# Patient Record
Sex: FEMALE | Race: WHITE | NOT HISPANIC OR LATINO | Employment: OTHER | URBAN - METROPOLITAN AREA
[De-identification: names, ages, dates, MRNs, and addresses within clinical notes are randomized per-mention and may not be internally consistent; named-entity substitution may affect disease eponyms.]

---

## 2017-08-03 ENCOUNTER — INITIAL CONSULT (OUTPATIENT)
Dept: ELECTROPHYSIOLOGY | Facility: CLINIC | Age: 67
End: 2017-08-03
Payer: MEDICARE

## 2017-08-03 ENCOUNTER — HOSPITAL ENCOUNTER (OUTPATIENT)
Dept: CARDIOLOGY | Facility: CLINIC | Age: 67
Discharge: HOME | End: 2017-08-03
Payer: MEDICARE

## 2017-08-03 VITALS
HEART RATE: 63 BPM | DIASTOLIC BLOOD PRESSURE: 80 MMHG | BODY MASS INDEX: 39.49 KG/M2 | WEIGHT: 222.88 LBS | HEIGHT: 63 IN | SYSTOLIC BLOOD PRESSURE: 146 MMHG

## 2017-08-03 DIAGNOSIS — R00.0 TACHYCARDIA: ICD-10-CM

## 2017-08-03 DIAGNOSIS — I47.10 SVT (SUPRAVENTRICULAR TACHYCARDIA): Primary | ICD-10-CM

## 2017-08-03 PROCEDURE — 99205 OFFICE O/P NEW HI 60 MIN: CPT | Mod: S$PBB,,, | Performed by: INTERNAL MEDICINE

## 2017-08-03 PROCEDURE — 93005 ELECTROCARDIOGRAM TRACING: CPT | Mod: PBBFAC | Performed by: INTERNAL MEDICINE

## 2017-08-03 PROCEDURE — 1126F AMNT PAIN NOTED NONE PRSNT: CPT | Mod: ,,, | Performed by: INTERNAL MEDICINE

## 2017-08-03 PROCEDURE — 99999 PR PBB SHADOW E&M-EST. PATIENT-LVL III: CPT | Mod: PBBFAC,,, | Performed by: INTERNAL MEDICINE

## 2017-08-03 PROCEDURE — 1159F MED LIST DOCD IN RCRD: CPT | Mod: ,,, | Performed by: INTERNAL MEDICINE

## 2017-08-03 PROCEDURE — 99213 OFFICE O/P EST LOW 20 MIN: CPT | Mod: PBBFAC | Performed by: INTERNAL MEDICINE

## 2017-08-03 PROCEDURE — 93010 ELECTROCARDIOGRAM REPORT: CPT | Mod: S$PBB,,, | Performed by: INTERNAL MEDICINE

## 2017-08-03 PROCEDURE — 3008F BODY MASS INDEX DOCD: CPT | Mod: ,,, | Performed by: INTERNAL MEDICINE

## 2017-08-03 RX ORDER — MONTELUKAST SODIUM 10 MG/1
10 TABLET ORAL
COMMUNITY
Start: 2017-07-10 | End: 2018-07-10

## 2017-08-03 RX ORDER — FUROSEMIDE 20 MG/1
30 TABLET ORAL
COMMUNITY
Start: 2017-06-25 | End: 2018-06-25

## 2017-08-03 RX ORDER — FLUTICASONE FUROATE AND VILANTEROL 100; 25 UG/1; UG/1
1 POWDER RESPIRATORY (INHALATION)
COMMUNITY
Start: 2017-06-19

## 2017-08-03 RX ORDER — KETOCONAZOLE 20 MG/G
CREAM TOPICAL
COMMUNITY
Start: 2017-06-14 | End: 2018-02-22

## 2017-08-03 RX ORDER — BRIMONIDINE TARTRATE 1 MG/ML
SOLUTION/ DROPS OPHTHALMIC
COMMUNITY
Start: 2017-03-14

## 2017-08-03 RX ORDER — IPRATROPIUM BROMIDE AND ALBUTEROL SULFATE 2.5; .5 MG/3ML; MG/3ML
SOLUTION RESPIRATORY (INHALATION)
COMMUNITY
Start: 2017-06-19

## 2017-08-03 RX ORDER — ACETAMINOPHEN, DIPHENHYDRAMINE HCL, PHENYLEPHRINE HCL 325; 25; 5 MG/1; MG/1; MG/1
TABLET ORAL
COMMUNITY

## 2017-08-03 NOTE — PROGRESS NOTES
Subjective:    Patient ID:  Puneet Burk is a 66 y.o. female who presents for evaluation of Tachycardia      66 yoF HTN, SVT here for evaluation. She had an SVT episode with  bpm that was responsive to adenosine (2004). Prior to this she had similar symptoms for several years and was able to break the events with coughing and bearing down. She was placed on verapamil for suppression which worked for many years. She had hypotension leading to removing her verapamil recently. She then had onset of SVT events and is back on her verapamil. She feels palpitations on most days, mostly at night. They can last several minutes to an hour. She gets shortness of breath with her episodes. She feels that the events are worsening. She has normal LV function based on a normal stress test 2015 per her recollection. She had a documented normal stress test 2010. She underwent an angiogram 2004 after her SVT episode which showed normal coronary arteries. No history of syncope or near syncope. No recent cardiac studies or evaluation.     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  No date: TUBAL LIGATION  No date: VULVECTOMY    Social History    Marital status:              Spouse name:                       Years of education:                 Number of children:               Occupational History    None on file    Social History Main Topics    Smoking status:  Current Some Day Smoker                                                      Packs/day: 1.50      Years: 0.00        Smokeless tobacco: Never Used                        Alcohol use: Yes           1.2 oz/week       Glasses of wine: 2 per week       Comment: daily    Drug use: No              Sexual activity: Not on file          Other Topics            Concern    None on file    Social History Narrative    None on file    Review of patient's family history indicates:    Hypertension                   Mother                    Diabetes                       Mother                    Hypertension                   Father                    Hypertension                   Sister                    Diabetes                       Sister                    Ovarian cancer                 Neg Hx                    Uterine cancer                 Neg Hx                    Breast cancer                  Neg Hx                    Colon cancer                   Neg Hx                          Review of Systems   Constitution: Negative.   HENT: Negative.    Eyes: Negative.    Cardiovascular: Negative for chest pain, dyspnea on exertion, leg swelling, near-syncope, palpitations and syncope.   Respiratory: Negative.  Negative for shortness of breath.    Endocrine: Negative.    Hematologic/Lymphatic: Negative.    Skin: Negative.    Musculoskeletal: Negative.    Gastrointestinal: Negative.    Genitourinary: Negative.    Neurological: Negative.  Negative for dizziness and light-headedness.   Psychiatric/Behavioral: Negative.    Allergic/Immunologic: Negative.         Objective:    Physical Exam   Constitutional: She is oriented to person, place, and time. She appears well-developed and well-nourished. No distress.   HENT:   Head: Normocephalic and atraumatic.   Eyes: EOM are normal. Pupils are equal, round, and reactive to light.   Neck: Normal range of motion. No JVD present. No thyromegaly present.   Cardiovascular: Normal rate,  regular rhythm, S1 normal, S2 normal and normal heart sounds.  PMI is not displaced.  Exam reveals no gallop and no friction rub.    No murmur heard.  Pulmonary/Chest: Effort normal and breath sounds normal. No respiratory distress. She has no wheezes. She has no rales.   Abdominal: Soft. Bowel sounds are normal. She exhibits no distension. There is no tenderness. There is no rebound and no guarding.   Musculoskeletal: Normal range of motion. She exhibits no edema or tenderness.   Neurological: She is alert and oriented to person, place, and time. No cranial nerve deficit.   Skin: Skin is warm and dry. No rash noted. No erythema.   Psychiatric: She has a normal mood and affect. Her behavior is normal. Judgment and thought content normal.   Vitals reviewed.    ECG: NSR nl NV, QRS, QTc        Assessment:       1. SVT (supraventricular tachycardia)         Plan:       66 yoF with history of SVT here for evaluation. She has history and symptoms most likely consistent with an AVN dependent SVT. She is having recurrent palpitations on verapamil. It is unclear if her events are due to her primary SVT or another rhythm disturbance. Will get a Zio patch to assess her symptom/rhythm correlation. RTC 4-6 weeks.

## 2017-08-04 DIAGNOSIS — R00.0 TACHYCARDIA: Primary | ICD-10-CM

## 2017-08-31 ENCOUNTER — CLINICAL SUPPORT (OUTPATIENT)
Dept: ELECTROPHYSIOLOGY | Facility: CLINIC | Age: 67
End: 2017-08-31
Payer: MEDICARE

## 2017-08-31 DIAGNOSIS — I47.10 SVT (SUPRAVENTRICULAR TACHYCARDIA): ICD-10-CM

## 2017-08-31 PROCEDURE — 0296T HOLTER MONITOR - 3-14 DAY ADULT: CPT | Mod: PBBFAC | Performed by: INTERNAL MEDICINE

## 2017-08-31 PROCEDURE — 0298T HOLTER MONITOR - 3-14 DAY ADULT: CPT | Mod: ,,, | Performed by: INTERNAL MEDICINE

## 2017-09-24 ENCOUNTER — PATIENT MESSAGE (OUTPATIENT)
Dept: ELECTROPHYSIOLOGY | Facility: CLINIC | Age: 67
End: 2017-09-24

## 2017-09-28 ENCOUNTER — PATIENT MESSAGE (OUTPATIENT)
Dept: ELECTROPHYSIOLOGY | Facility: CLINIC | Age: 67
End: 2017-09-28

## 2017-09-30 ENCOUNTER — TELEPHONE (OUTPATIENT)
Dept: ELECTROPHYSIOLOGY | Facility: CLINIC | Age: 67
End: 2017-09-30

## 2017-09-30 NOTE — TELEPHONE ENCOUNTER
F/u apt on 10/3 canceled. MD called pt with results from zio monitor on 9/28, and pt agrees in-clinic follow up not necessary next week. She will call back with any questions/concerns/issues.

## 2018-01-19 ENCOUNTER — TELEPHONE (OUTPATIENT)
Dept: ENDOCRINOLOGY | Facility: CLINIC | Age: 68
End: 2018-01-19

## 2018-01-19 NOTE — TELEPHONE ENCOUNTER
----- Message from Rita Hankins sent at 1/19/2018  9:45 AM CST -----  Contact: Self  Returning your call.

## 2018-01-19 NOTE — TELEPHONE ENCOUNTER
1st attempt.    ----- Message from Rita Hankins sent at 1/18/2018  3:26 PM CST -----  Contact: Self  Ms Burk and Rios had appts together on 1/18 ( Dip) but had to cancel d/t bad weather I was able to reschedule Rios on 2/22 but could not get her the same day appt. They travel from [place] is there a way you can schedule appts together.    Thanks  Rios MRN-[id number]

## 2018-02-22 ENCOUNTER — HOSPITAL ENCOUNTER (OUTPATIENT)
Dept: RADIOLOGY | Facility: HOSPITAL | Age: 68
Discharge: HOME | End: 2018-02-22
Attending: INTERNAL MEDICINE
Payer: MEDICARE

## 2018-02-22 ENCOUNTER — OFFICE VISIT (OUTPATIENT)
Dept: ENDOCRINOLOGY | Facility: CLINIC | Age: 68
End: 2018-02-22
Payer: MEDICARE

## 2018-02-22 VITALS
HEIGHT: 63 IN | DIASTOLIC BLOOD PRESSURE: 70 MMHG | BODY MASS INDEX: 38.28 KG/M2 | WEIGHT: 216.06 LBS | SYSTOLIC BLOOD PRESSURE: 140 MMHG | HEART RATE: 74 BPM

## 2018-02-22 DIAGNOSIS — R73.09 ABNORMAL GLUCOSE: Primary | ICD-10-CM

## 2018-02-22 DIAGNOSIS — S32.9XXS CLOSED NONDISPLACED FRACTURE OF PELVIS, UNSPECIFIED PART OF PELVIS, SEQUELA: ICD-10-CM

## 2018-02-22 DIAGNOSIS — M85.80 LOW BONE MASS: ICD-10-CM

## 2018-02-22 DIAGNOSIS — E89.0 HYPOTHYROIDISM, POSTRADIOIODINE THERAPY: ICD-10-CM

## 2018-02-22 PROCEDURE — 99213 OFFICE O/P EST LOW 20 MIN: CPT | Mod: PBBFAC | Performed by: INTERNAL MEDICINE

## 2018-02-22 PROCEDURE — 99999 PR PBB SHADOW E&M-EST. PATIENT-LVL III: CPT | Mod: PBBFAC,,, | Performed by: INTERNAL MEDICINE

## 2018-02-22 PROCEDURE — 99204 OFFICE O/P NEW MOD 45 MIN: CPT | Mod: S$PBB,,, | Performed by: INTERNAL MEDICINE

## 2018-02-22 PROCEDURE — 73521 X-RAY EXAM HIPS BI 2 VIEWS: CPT | Mod: TC

## 2018-02-22 PROCEDURE — 1159F MED LIST DOCD IN RCRD: CPT | Mod: ,,, | Performed by: INTERNAL MEDICINE

## 2018-02-22 PROCEDURE — 73521 X-RAY EXAM HIPS BI 2 VIEWS: CPT | Mod: 26,,, | Performed by: RADIOLOGY

## 2018-02-22 PROCEDURE — 1126F AMNT PAIN NOTED NONE PRSNT: CPT | Mod: ,,, | Performed by: INTERNAL MEDICINE

## 2018-02-22 RX ORDER — ESOMEPRAZOLE MAGNESIUM 20 MG/1
20 GRANULE, DELAYED RELEASE ORAL
COMMUNITY

## 2018-02-22 RX ORDER — VIT C/E/ZN/COPPR/LUTEIN/ZEAXAN 250MG-90MG
1 CAPSULE ORAL DAILY
COMMUNITY

## 2018-02-22 RX ORDER — LEVOTHYROXINE SODIUM 137 UG/1
1 TABLET ORAL DAILY
COMMUNITY
Start: 2017-04-17 | End: 2018-02-26

## 2018-02-22 RX ORDER — CLOPIDOGREL BISULFATE 75 MG/1
75 TABLET ORAL
COMMUNITY
Start: 2017-08-10 | End: 2018-02-22 | Stop reason: SDUPTHER

## 2018-02-22 RX ORDER — POTASSIUM CHLORIDE 750 MG/1
1 TABLET, EXTENDED RELEASE ORAL DAILY
COMMUNITY
Start: 2017-11-02

## 2018-02-22 NOTE — PROGRESS NOTES
Subjective:     Patient ID: Puneet Burk is a 67 y.o. female.    Chief Complaint: Osteoporosis and Hyperthyroidism    HPI:   Ms. Burk is a 67 y.o. female who is here for a consult visit for evaluation  and management of osteoporosis and hyperthyroidism due to Grave's disease.  Diagnosed with Graves' disease forty years ago. Treated with radioactive iodine (twice) without apparent change to proptosis of her eyes.   Levothyroxine 137 mcg one tablet daily on empty stomach with water but with her other pills.   (takes it with potassium, lasix and ca-vitamin D3). The dose was changed three months ago, this was increased from 100 mcg daily.     Has PVCs with palpitations but no recent worsening. Uses cpap machine nightly. Denies tremulousness or anxiety.     Diagnosed with bone density one year ago. Left FN - 2.9 on scan done 4/2017, previously had osteopenia of the lumbar spine five years ago.     Fractured her left hip/pelvis after falling out of the bath tub (10/2016). She attributed pain after fall to arthritis. She received conservative care for pain (not known she had fracture) including steroid injections. (she did not have surgery. Underwent PT once hip/pelvis fracture was identified 7/2017).   Has not used any treatments.     Osteoporosis Risk Factor Assessment:    Menarche occurred at 8 years of age.   Menopause occurred at early to mid 40s. Her menstrual cycles were normal throughout her reproductive life.   Denies past history of chronic illness, restrictive dieting as a child/adolescent or young adult.     Denies history of falls over the age of fifty or fractures to her wrist, hip or spine. Not sure if lost height.   Grandmother with osteoporosis. Denies hip fractures in mother or sister. (sister with fracture of spine)     Used prempro for about ten years. Uses steroids injections for sinus infections -- one per year. Uses nexium 20 mg daily for many years.   Denies exposure to diabetes medications or  antiseizure medications.     She denies calcium disorders, kidney stones, malabsorption symptoms, including chronic diarrhea, bloating, anemia or kidney disease. Reports increased gas since using CPAP machine.     Supplements:  Calcium citrate  Vitamin D3     Dietary:  Cheese     Review of Systems   Constitutional: Negative for chills and fever.        Flu about one month ago.    HENT: Negative for congestion and sinus pressure.    Eyes: Negative for visual disturbance.   Respiratory: Negative for chest tightness and shortness of breath.    Cardiovascular: Positive for leg swelling. Negative for chest pain and palpitations.   Gastrointestinal: Negative for abdominal pain and vomiting.   Genitourinary: Negative for dysuria.        UTI   Musculoskeletal: Negative for arthralgias.   Skin: Negative for rash.   Neurological: Negative for weakness.   Hematological: Does not bruise/bleed easily.   Psychiatric/Behavioral: Negative for sleep disturbance.        Objective:     Physical Exam   Constitutional: She is oriented to person, place, and time. She appears well-developed and well-nourished. No distress.   HENT:   Head: Normocephalic and atraumatic.   Nose: Nose normal.   Mouth/Throat: Oropharynx is clear and moist. No oropharyngeal exudate.   Eyes: Conjunctivae and EOM are normal. Pupils are equal, round, and reactive to light. No scleral icterus.   Neck: Normal range of motion. Neck supple. No thyromegaly present.   + soft tissue swelling around neck     Cardiovascular: Normal rate, regular rhythm, normal heart sounds and intact distal pulses.    Pulmonary/Chest: Effort normal and breath sounds normal.   Abdominal: Soft. Bowel sounds are normal. She exhibits no distension.   Musculoskeletal: Normal range of motion. She exhibits no edema or tenderness.   Lymphadenopathy:     She has no cervical adenopathy.   Neurological: She is alert and oriented to person, place, and time. She has normal reflexes.   Skin: Skin is  "warm and dry.   Psychiatric: She has a normal mood and affect.       Vitals:    02/22/18 1312   BP: (!) 140/70   BP Location: Left arm   Patient Position: Sitting   BP Method: Medium (Manual)   Pulse: 74   Weight: 98 kg (216 lb 0.8 oz)   Height: 5' 3" (1.6 m)       Assessment/Plan:     Ms. Burk is a 67 year old woman with osteoporosis diagnosed from recent T score of left FN. JOSE J is not quite adequate and also area of previous fracture.     1. Low bone mass  - continue calcium and vitamin D  - decreased treatment options if needed.   - may need a repeat DXA  - Vitamin D; Future    2. Hypothyroidism, postradioiodine therapy  - big dose, asked her to take separately from the rest of her pills   - avoid iatrogenic thyrotoxicosis bone loss and pvc's  - TSH; Future    3. Abnormal glucose  - Basic metabolic panel; Future  - Hemoglobin A1c; Future    4. Closed nondisplaced fracture of pelvis, unspecified part of pelvis, sequela  - X-Ray Hips Bilateral 2 View Incl AP Pelvis; Future        "

## 2018-02-22 NOTE — PATIENT INSTRUCTIONS
Please make sure you are taking thyroid hormone on an empty stomach with water only, wait thirty to forty five minutes before you take any other pill, vitamin or food.

## 2018-02-26 ENCOUNTER — PATIENT MESSAGE (OUTPATIENT)
Dept: ENDOCRINOLOGY | Facility: CLINIC | Age: 68
End: 2018-02-26

## 2018-02-26 DIAGNOSIS — E05.80 THYROTOXICOSIS, EXOGENOUS IATROGENIC: Primary | ICD-10-CM

## 2018-02-26 RX ORDER — LEVOTHYROXINE SODIUM 112 UG/1
112 TABLET ORAL DAILY
Qty: 30 TABLET | Refills: 11 | Status: SHIPPED | OUTPATIENT
Start: 2018-02-26 | End: 2019-02-20 | Stop reason: SDUPTHER

## 2018-02-26 NOTE — TELEPHONE ENCOUNTER
Hip x rays - no signs of fracture.   bmd   Subclinical hyperthyroidism    PLAN:  1) obtain previous films demonstrating x ray  2) decrease LT4 112 mcg daily, take appropriately.   3) repeat TSH in three months.

## 2018-03-07 ENCOUNTER — TELEPHONE (OUTPATIENT)
Dept: ENDOCRINOLOGY | Facility: CLINIC | Age: 68
End: 2018-03-07

## 2018-03-07 NOTE — TELEPHONE ENCOUNTER
Bilateral hip x rays includes an AP pelvis view.   No further x ray needed.   Will await to review patient's reports before ordering additional films.     Have called and left message for patient.     SD

## 2018-03-08 ENCOUNTER — TELEPHONE (OUTPATIENT)
Dept: ENDOCRINOLOGY | Facility: CLINIC | Age: 68
End: 2018-03-08

## 2018-03-08 NOTE — TELEPHONE ENCOUNTER
Spoke with pt. Will mail her her records.     ----- Message from Emily Bradley sent at 3/8/2018 12:47 PM CST -----  Contact: lori Warner      Pt left brown  envelope that you made copies for insurance at   Yesterday     Forgot to pick it up when finished all appts     I called  said they didn't have it       Please call pt   [phone]      Thanks

## 2018-04-16 ENCOUNTER — PATIENT MESSAGE (OUTPATIENT)
Dept: ENDOCRINOLOGY | Facility: CLINIC | Age: 68
End: 2018-04-16

## 2018-05-01 ENCOUNTER — PATIENT MESSAGE (OUTPATIENT)
Dept: ENDOCRINOLOGY | Facility: CLINIC | Age: 68
End: 2018-05-01

## 2018-06-01 ENCOUNTER — LAB VISIT (OUTPATIENT)
Dept: LAB | Facility: HOSPITAL | Age: 68
End: 2018-06-01
Attending: INTERNAL MEDICINE
Payer: MEDICARE

## 2018-06-01 DIAGNOSIS — E05.80 THYROTOXICOSIS, EXOGENOUS IATROGENIC: ICD-10-CM

## 2018-06-01 LAB — TSH SERPL DL<=0.005 MIU/L-ACNC: 1.86 UIU/ML

## 2018-06-01 PROCEDURE — 84443 ASSAY THYROID STIM HORMONE: CPT

## 2018-06-01 PROCEDURE — 36415 COLL VENOUS BLD VENIPUNCTURE: CPT

## 2018-06-03 ENCOUNTER — PATIENT MESSAGE (OUTPATIENT)
Dept: ENDOCRINOLOGY | Facility: CLINIC | Age: 68
End: 2018-06-03

## 2018-06-04 ENCOUNTER — TELEPHONE (OUTPATIENT)
Dept: ENDOCRINOLOGY | Facility: CLINIC | Age: 68
End: 2018-06-04

## 2018-06-04 DIAGNOSIS — E89.0 HYPOTHYROIDISM FOLLOWING RADIOIODINE THERAPY: ICD-10-CM

## 2018-06-04 DIAGNOSIS — M85.9 DISORDER OF BONE DENSITY AND STRUCTURE, UNSPECIFIED: ICD-10-CM

## 2018-06-04 DIAGNOSIS — M85.80 HYPEROSTOSIS: Primary | ICD-10-CM

## 2018-06-04 DIAGNOSIS — M80.00XD OSTEOPOROSIS WITH CURRENT PATHOLOGICAL FRACTURE WITH ROUTINE HEALING, UNSPECIFIED OSTEOPOROSIS TYPE, SUBSEQUENT ENCOUNTER: ICD-10-CM

## 2018-06-04 DIAGNOSIS — Z78.0 ASYMPTOMATIC POSTMENOPAUSAL ESTROGEN DEFICIENCY: Primary | ICD-10-CM

## 2018-06-04 RX ORDER — ALENDRONATE SODIUM 70 MG/1
TABLET ORAL
Qty: 4 TABLET | Refills: 11 | Status: SHIPPED | OUTPATIENT
Start: 2018-06-04 | End: 2019-06-04

## 2019-02-20 RX ORDER — LEVOTHYROXINE SODIUM 112 UG/1
112 TABLET ORAL DAILY
Qty: 30 TABLET | Refills: 1 | Status: SHIPPED | OUTPATIENT
Start: 2019-02-20 | End: 2019-04-21

## 2019-02-20 NOTE — TELEPHONE ENCOUNTER
----- Message from Georgia Mccarthy sent at 2/20/2019 11:43 AM CST -----  Contact: [facility]   ..Rx Refill/Request     Is this a Refill or New Rx:  Refill   Rx Name and Strength:  levothyroxine (SYNTHROID) 112 MCG tablet       Preferred Pharmacy with phone number: [facility] - [place], [place] - [address] [place] [address]  Communication Preference:phone   Additional Information: